# Patient Record
Sex: FEMALE | Race: ASIAN | Employment: FULL TIME | URBAN - NONMETROPOLITAN AREA
[De-identification: names, ages, dates, MRNs, and addresses within clinical notes are randomized per-mention and may not be internally consistent; named-entity substitution may affect disease eponyms.]

---

## 2022-03-02 ENCOUNTER — OFFICE VISIT (OUTPATIENT)
Dept: FAMILY MEDICINE CLINIC | Age: 32
End: 2022-03-02
Payer: COMMERCIAL

## 2022-03-02 VITALS
TEMPERATURE: 97.9 F | DIASTOLIC BLOOD PRESSURE: 78 MMHG | SYSTOLIC BLOOD PRESSURE: 104 MMHG | OXYGEN SATURATION: 98 % | WEIGHT: 184.6 LBS | BODY MASS INDEX: 32.71 KG/M2 | HEART RATE: 79 BPM | HEIGHT: 63 IN

## 2022-03-02 DIAGNOSIS — R40.0 DAYTIME SOMNOLENCE: ICD-10-CM

## 2022-03-02 DIAGNOSIS — R06.83 SNORING: Primary | ICD-10-CM

## 2022-03-02 PROBLEM — B97.7 HUMAN PAPILLOMA VIRUS (HPV) INFECTION: Status: ACTIVE | Noted: 2022-03-02

## 2022-03-02 PROBLEM — R87.810 CERVICAL HIGH RISK HUMAN PAPILLOMAVIRUS (HPV) DNA TEST POSITIVE: Status: ACTIVE | Noted: 2022-03-02

## 2022-03-02 PROCEDURE — 99203 OFFICE O/P NEW LOW 30 MIN: CPT | Performed by: FAMILY MEDICINE

## 2022-03-02 RX ORDER — LEVONORGESTREL AND ETHINYL ESTRADIOL 0.1-0.02MG
KIT ORAL
COMMUNITY

## 2022-03-02 SDOH — ECONOMIC STABILITY: FOOD INSECURITY: WITHIN THE PAST 12 MONTHS, THE FOOD YOU BOUGHT JUST DIDN'T LAST AND YOU DIDN'T HAVE MONEY TO GET MORE.: NEVER TRUE

## 2022-03-02 SDOH — ECONOMIC STABILITY: FOOD INSECURITY: WITHIN THE PAST 12 MONTHS, YOU WORRIED THAT YOUR FOOD WOULD RUN OUT BEFORE YOU GOT MONEY TO BUY MORE.: NEVER TRUE

## 2022-03-02 ASSESSMENT — PATIENT HEALTH QUESTIONNAIRE - PHQ9
SUM OF ALL RESPONSES TO PHQ QUESTIONS 1-9: 0
SUM OF ALL RESPONSES TO PHQ9 QUESTIONS 1 & 2: 0
SUM OF ALL RESPONSES TO PHQ QUESTIONS 1-9: 0
2. FEELING DOWN, DEPRESSED OR HOPELESS: 0
SUM OF ALL RESPONSES TO PHQ QUESTIONS 1-9: 0
SUM OF ALL RESPONSES TO PHQ QUESTIONS 1-9: 0
1. LITTLE INTEREST OR PLEASURE IN DOING THINGS: 0

## 2022-03-02 ASSESSMENT — SOCIAL DETERMINANTS OF HEALTH (SDOH): HOW HARD IS IT FOR YOU TO PAY FOR THE VERY BASICS LIKE FOOD, HOUSING, MEDICAL CARE, AND HEATING?: NOT HARD AT ALL

## 2022-03-02 NOTE — PROGRESS NOTES
Diagnosis Orders   1. Snoring  Sleep Study with PAP Titration   2. Daytime somnolence  Sleep Study with PAP Titration     Return for Based on test results. Patient Instructions       Patient Education        Snoring: Care Instructions  Overview  Snoring is a noise that you may make while breathing during sleep. You snore when the flow of air from your mouth or nose to your lungs makes the tissues of your throat vibrate while you sleep. This usually is caused by a blockage or narrowing in your nose, mouth, or throat (airway). Snoring can be soft, loud, raspy, harsh, hoarse, or fluttering. If you have a bed partner, they may notice that you sleep with your mouth open or that you're restless while sleeping. If snoring interferes with your or your bed partner's sleep, either or both of you may feel tired during the day. You may be able to help reduce your snoring by making changes in your activities and in the way you sleep. Follow-up care is a key part of your treatment and safety. Be sure to make and go to all appointments, and call your doctor if you are having problems. It's also a good idea to know your test results and keep a list of the medicines you take. How can you care for yourself at home? · Lose weight, if needed. Many people who snore are overweight. Weight loss can help reduce the narrowing of the airway and might reduce or stop snoring. · Limit the use of alcohol and medicines. Drinking a lot of alcohol or taking certain medicines, especially sleeping pills or tranquilizers, before sleep may make snoring worse. · Go to bed at the same time each night, and get plenty of sleep. You may snore more when you have not had enough sleep. · Sleep on your side. Sleeping on your side may stop snoring. Try sewing a pocket in the middle of the back of your paLitRes top, putting a tennis ball into the pocket, and stitching it closed. This will help keep you from sleeping on your back.   · Treat breathing problems. For example, a blocked or stuffy nose caused by colds or allergies can disturb airflow. This can lead to snoring. · Use a device that helps keep your airway open during sleep. This could be a device that you put in your mouth. Other examples include strips or disks that you use on your nose. · Do not smoke. Smoking can make snoring worse. If you need help quitting, talk to your doctor about stop-smoking programs and medicines. These can increase your chances of quitting for good. · Raise the head of your bed 4 to 6 inches by putting bricks under the legs of the bed. This may prevent your tongue from falling toward the back of the throat, which can make a blocked or narrow airway worse. Putting pillows under your head will not help. When should you call for help? Watch closely for changes in your health, and be sure to contact your doctor if:    · You snore, and you feel sleepy during the day.     · Your sleeping partner or you notice that you gasp, choke, or stop breathing during sleep.     · You do not get better as expected. Where can you learn more? Go to https://Bioformix.INFIMET. org and sign in to your Playmysong account. Enter Q378 in the LogLogic box to learn more about \"Snoring: Care Instructions. \"     If you do not have an account, please click on the \"Sign Up Now\" link. Current as of: July 6, 2021               Content Version: 13.1  © 2006-2021 [employer]. Care instructions adapted under license by [facility] ([facility]). If you have questions about a medical condition or this instruction, always ask your healthcare professional. [place] [address] any warranty or liability for your use of this information.              Subjective:      Patient ID: Jigna Van is a 27 y.o. female who presents for:  Chief Complaint   Patient presents with    Establish Care     pcp last vist in many years only been seeing OB regularly     Sleep Apnea snoring getting worse x 6 months        Patient states she has been seeing her OB/GYN routinely. She would like to have a PCP and make sure she is up-to-date on all her health maintenance. She will have her records transferred and will double check this. She has been getting her ThinPrep is done yearly with a GYN. Patient also notes her boyfriend states she is snoring more often. It is gotten worse in the last couple years. She has been gaining weight. She notes that when she wakes she does not feel refreshed. She feels like she could always take a nap. Current Outpatient Medications on File Prior to Visit   Medication Sig Dispense Refill    levonorgestrel-ethinyl estradiol (LUTERA) 0.1-20 MG-MCG per tablet Take by mouth       No current facility-administered medications on file prior to visit. Past Medical History:   Diagnosis Date    Skin ulcer of abdominal wall ([facility] [address]     in high school x 20 years ago      History reviewed. No pertinent surgical history. Social History     Socioeconomic History    Marital status: Single     Spouse name: Not on file    Number of children: Not on file    Years of education: Not on file    Highest education level: Not on file   Occupational History    Not on file   Tobacco Use    Smoking status: Never Smoker    Smokeless tobacco: Never Used   Substance and Sexual Activity    Alcohol use: Yes     Comment: social    Drug use: Never    Sexual activity: Not on file   Other Topics Concern    Not on file   Social History Narrative    Not on file     Social Determinants of Health     Financial Resource Strain: Low Risk     Difficulty of Paying Living Expenses: Not hard at all   Food Insecurity: No Food Insecurity    Worried About [address] in the Last Year: Never true    [address] in the Last Year: Never true   Transportation Needs:     Lack of Transportation (Medical): Not on file    Lack of Transportation (Non-Medical):  Not on file Physical Activity:     Days of Exercise per Week: Not on file    Minutes of Exercise per Session: Not on file   Stress:     Feeling of Stress : Not on file   Social Connections:     Frequency of Communication with Friends and Family: Not on file    Frequency of Social Gatherings with Friends and Family: Not on file    Attends Christianity Services: Not on file    Active Member of [address] [place] [address] [employer] or Organizations: Not on file    Attends Club or Organization Meetings: Not on file    Marital Status: Not on file   Intimate Partner Violence:     Fear of Current or Ex-Partner: Not on file    Emotionally Abused: Not on file    Physically Abused: Not on file    Sexually Abused: Not on file   Housing Stability:     Unable to Pay for Housing in the Last Year: Not on file    Number of [place] in the Last Year: Not on file    Unstable Housing in the Last Year: Not on file     Family History   Problem Relation Age of Onset    Stroke Father     Diabetes Sister     Diabetes Maternal Grandmother     Asthma Neg Hx     Cancer Neg Hx      Allergies:  Patient has no known allergies. Review of Systems   Constitutional: Positive for fatigue. Negative for chills, diaphoresis and fever. Psychiatric/Behavioral: Positive for sleep disturbance. Negative for agitation, behavioral problems and confusion. The patient is not nervous/anxious. Objective:   /78   Pulse 79   Temp 97.9 °F (36.6 °C)   Ht 5' 3\" (1.6 m)   Wt 184 lb 9.6 oz (83.7 kg)   LMP 02/16/2022   SpO2 98%   BMI 32.70 kg/m²     Physical Exam  Constitutional:       General: She is not in acute distress. Appearance: She is well-developed. She is not diaphoretic. HENT:      Head: Normocephalic and atraumatic. Right Ear: External ear normal.      Left Ear: External ear normal.      Nose: Nose normal.   Eyes:      General:         Right eye: No discharge. Left eye: No discharge.       Conjunctiva/sclera: Conjunctivae normal. Pupils: Pupils are equal, round, and reactive to light. Neck:      Thyroid: No thyromegaly. Trachea: No tracheal deviation. Cardiovascular:      Rate and Rhythm: Normal rate and regular rhythm. Pulmonary:      Effort: Pulmonary effort is normal. No respiratory distress. Abdominal:      General: There is no distension. Musculoskeletal:      Cervical back: Neck supple. Skin:     General: Skin is warm and dry. Findings: No bruising or rash. Neurological:      Mental Status: She is alert. Coordination: Coordination normal.   Psychiatric:         Thought Content: Thought content normal.         Judgment: Judgment normal.         No results found for this visit on 03/02/22. No results found for this or any previous visit (from the past 2016 hour(s)). [] Pt was seen by provider for      Minutes  Counseling and coordination of care was done for all assessment diagnosis listed for today with patient and any family/friend present. More than 50% of this visit was spent coordinating cuurent care, obtaining information for prior records, and counseling for current plan of action. Assessment:       Diagnosis Orders   1. Snoring  Sleep Study with PAP Titration   2. Daytime somnolence  Sleep Study with PAP Titration         Orders Placed This Encounter   Procedures    Sleep Study with PAP Titration     Standing Status:   Future     Standing Expiration Date:   6/2/2022     Order Specific Question:   Sleep Study Titration Type     Answer:   Split Night Study (Baseline followed by PAP Titration)     Order Specific Question:   Location For Sleep Study     Answer:   [facility]     Order Specific Question:   Select Sleep Lab Location     Answer:   [facility]       No orders of the defined types were placed in this encounter. Medication List          Accurate as of March 2, 2022  4:35 PM. If you have any questions, ask your nurse or doctor.             CONTINUE taking these medications    Lutera 0.1-20 MG-MCG per tablet  Generic drug: levonorgestrel-ethinyl estradiol              Plan:   Return for Based on test results. Patient Instructions       Patient Education        Snoring: Care Instructions  Overview  Snoring is a noise that you may make while breathing during sleep. You snore when the flow of air from your mouth or nose to your lungs makes the tissues of your throat vibrate while you sleep. This usually is caused by a blockage or narrowing in your nose, mouth, or throat (airway). Snoring can be soft, loud, raspy, harsh, hoarse, or fluttering. If you have a bed partner, they may notice that you sleep with your mouth open or that you're restless while sleeping. If snoring interferes with your or your bed partner's sleep, either or both of you may feel tired during the day. You may be able to help reduce your snoring by making changes in your activities and in the way you sleep. Follow-up care is a key part of your treatment and safety. Be sure to make and go to all appointments, and call your doctor if you are having problems. It's also a good idea to know your test results and keep a list of the medicines you take. How can you care for yourself at home? · Lose weight, if needed. Many people who snore are overweight. Weight loss can help reduce the narrowing of the airway and might reduce or stop snoring. · Limit the use of alcohol and medicines. Drinking a lot of alcohol or taking certain medicines, especially sleeping pills or tranquilizers, before sleep may make snoring worse. · Go to bed at the same time each night, and get plenty of sleep. You may snore more when you have not had enough sleep. · Sleep on your side. Sleeping on your side may stop snoring. Try sewing a pocket in the middle of the back of your pashopp top, putting a tennis ball into the pocket, and stitching it closed. This will help keep you from sleeping on your back.   · Treat breathing problems. For example, a blocked or stuffy nose caused by colds or allergies can disturb airflow. This can lead to snoring. · Use a device that helps keep your airway open during sleep. This could be a device that you put in your mouth. Other examples include strips or disks that you use on your nose. · Do not smoke. Smoking can make snoring worse. If you need help quitting, talk to your doctor about stop-smoking programs and medicines. These can increase your chances of quitting for good. · Raise the head of your bed 4 to 6 inches by putting bricks under the legs of the bed. This may prevent your tongue from falling toward the back of the throat, which can make a blocked or narrow airway worse. Putting pillows under your head will not help. When should you call for help? Watch closely for changes in your health, and be sure to contact your doctor if:    · You snore, and you feel sleepy during the day.     · Your sleeping partner or you notice that you gasp, choke, or stop breathing during sleep.     · You do not get better as expected. Where can you learn more? Go to https://Thrillist.com.MOD Systems. org and sign in to your ShareThe account. Enter V788 in the Shocking Technologies box to learn more about \"Snoring: Care Instructions. \"     If you do not have an account, please click on the \"Sign Up Now\" link. Current as of: July 6, 2021               Content Version: 13.1  © 2006-2021 [employer]. Care instructions adapted under license by [facility] ([facility]). If you have questions about a medical condition or this instruction, always ask your healthcare professional. [address] any warranty or liability for your use of this information. This note was partially created with the assistance of dictation. This may lead to grammatical or spelling errors. Parag Nowak M.D.

## 2022-03-02 NOTE — PATIENT INSTRUCTIONS
Patient Education        Snoring: Care Instructions  Overview  Snoring is a noise that you may make while breathing during sleep. You snore when the flow of air from your mouth or nose to your lungs makes the tissues of your throat vibrate while you sleep. This usually is caused by a blockage or narrowing in your nose, mouth, or throat (airway). Snoring can be soft, loud, raspy, harsh, hoarse, or fluttering. If you have a bed partner, they may notice that you sleep with your mouth open or that you're restless while sleeping. If snoring interferes with your or your bed partner's sleep, either or both of you may feel tired during the day. You may be able to help reduce your snoring by making changes in your activities and in the way you sleep. Follow-up care is a key part of your treatment and safety. Be sure to make and go to all appointments, and call your doctor if you are having problems. It's also a good idea to know your test results and keep a list of the medicines you take. How can you care for yourself at home? · Lose weight, if needed. Many people who snore are overweight. Weight loss can help reduce the narrowing of the airway and might reduce or stop snoring. · Limit the use of alcohol and medicines. Drinking a lot of alcohol or taking certain medicines, especially sleeping pills or tranquilizers, before sleep may make snoring worse. · Go to bed at the same time each night, and get plenty of sleep. You may snore more when you have not had enough sleep. · Sleep on your side. Sleeping on your side may stop snoring. Try sewing a pocket in the middle of the back of your paOz Sonotek top, putting a tennis ball into the pocket, and stitching it closed. This will help keep you from sleeping on your back. · Treat breathing problems. For example, a blocked or stuffy nose caused by colds or allergies can disturb airflow. This can lead to snoring. · Use a device that helps keep your airway open during sleep.

## 2022-04-21 ENCOUNTER — HOSPITAL ENCOUNTER (OUTPATIENT)
Dept: SLEEP CENTER | Age: 32
Discharge: HOME | End: 2022-04-23
Payer: COMMERCIAL

## 2022-04-21 PROCEDURE — 95810 POLYSOM 6/> YRS 4/> PARAM: CPT

## 2022-04-25 PROCEDURE — 95810 POLYSOM 6/> YRS 4/> PARAM: CPT | Performed by: INTERNAL MEDICINE

## 2022-06-07 ENCOUNTER — TELEMEDICINE (OUTPATIENT)
Dept: FAMILY MEDICINE CLINIC | Age: 32
End: 2022-06-07
Payer: COMMERCIAL

## 2022-06-07 DIAGNOSIS — J34.2 DEVIATED SEPTUM: ICD-10-CM

## 2022-06-07 DIAGNOSIS — R06.83 SNORING: Primary | ICD-10-CM

## 2022-06-07 PROCEDURE — 99213 OFFICE O/P EST LOW 20 MIN: CPT | Performed by: STUDENT IN AN ORGANIZED HEALTH CARE EDUCATION/TRAINING PROGRAM

## 2022-06-07 ASSESSMENT — ENCOUNTER SYMPTOMS
COUGH: 0
SINUS PRESSURE: 0
SORE THROAT: 0
ABDOMINAL PAIN: 0
SHORTNESS OF BREATH: 0
VOMITING: 0

## 2022-06-07 NOTE — PROGRESS NOTES
2022    TELEHEALTH EVALUATION -- Audio/Visual (During [place] [address] public health emergency)    Due to [place] 19 outbreak, patient's office visit was converted to a virtual visit. Patient was contacted and agreed to proceed with a virtual visit via [employer]. me  The risks and benefits of converting to a virtual visit were discussed in light of the current infectious disease epidemic. Patient also understood that insurance coverage and co-pays are up to their individual insurance plans. HPI:  Sandy Miramontes (:  1990) has requested an audio/video evaluation for the following concern(s):  Chief Complaint   Patient presents with    Results     Sleep Study. Review sleep study  No significant sleep apnea, no significant oxygen desaturation, mild snoring, normal sleep efficiency, normal sleep onset, no periodic limb movement  Prolonged REM onset 130 min    Patient has noticed weight gain over the last several years which seems to worsen her snoring  Typically she only sleeps 5 to 6 hours at nighttime due to work schedule  On the weekends she does try to \" catch up on sleep\"  She also does have a history of right-sided deviated septum  She was told by ENT in the past that she should have surgical correction    Review of Systems   Constitutional: Negative for chills and fever. HENT: Negative for congestion, sinus pressure and sore throat. Respiratory: Negative for cough and shortness of breath. Cardiovascular: Negative for chest pain and palpitations. Gastrointestinal: Negative for abdominal pain and vomiting. Musculoskeletal: Negative for arthralgias and myalgias. Skin: Negative for rash and wound. Neurological: Negative for speech difficulty and light-headedness. Psychiatric/Behavioral: Negative for suicidal ideas. The patient is not nervous/anxious. Prior to Visit Medications    Medication Sig Taking?  Authorizing Provider   levonorgestrel-ethinyl estradiol (LUTERA) 0.1-20 MG-MCG per tablet Take by mouth Yes Historical Provider, MD       Social History     Tobacco Use    Smoking status: Never Smoker    Smokeless tobacco: Never Used   Substance Use Topics    Alcohol use: Yes     Comment: social    Drug use: Never        No Known Allergies,   Past Medical History:   Diagnosis Date    Skin ulcer of abdominal wall ([address]     in high school x 20 years ago    , History reviewed. No pertinent surgical history. ,   Social History     Tobacco Use    Smoking status: Never Smoker    Smokeless tobacco: Never Used   Substance Use Topics    Alcohol use: Yes     Comment: social    Drug use: Never   ,   Family History   Problem Relation Age of Onset    Stroke Father     Diabetes Sister     Diabetes Maternal Grandmother     Asthma Neg Hx     Cancer Neg Hx    ,   Immunization History   Administered Date(s) Administered    COVID-19, Pfizer Purple top, DILUTE for use, 12+ yrs, 30mcg/0.3mL dose 07/31/2021, 08/21/2021   ,   Health Maintenance   Topic Date Due    Varicella vaccine (1 of 2 - 2-dose childhood series) Never done    HIV screen  Never done    Hepatitis C screen  Never done    DTaP/Tdap/Td vaccine (1 - Tdap) Never done    Cervical cancer screen  Never done    COVID-19 Vaccine (3 - Booster for [email or web address] series) 01/21/2022    Flu vaccine (Season Ended) 09/01/2022    Depression Screen  03/02/2023    Hepatitis A vaccine  Aged Out    Hepatitis B vaccine  Aged Out    Hib vaccine  Aged Out    Meningococcal (ACWY) vaccine  Aged Out    Pneumococcal 0-64 years Vaccine  Aged Out       PHYSICAL EXAMINATION:  [ INSTRUCTIONS:  \"[x]\" Indicates a positive item  \"[]\" Indicates a negative item  -- DELETE ALL ITEMS NOT EXAMINED]  [x] Alert  [] Oriented to person/place/time    [x] No apparent distress  [] Toxic appearing    [] Face flushed appearing [x] Sclera clear  [] Lips are cyanotic      [x] Breathing appears normal  [] Appears tachypneic      [] Rash on visible skin    [x] Cranial Nerves II-XII grossly intact    [x] Motor grossly intact in visible upper extremities    [] Motor grossly intact in visible lower extremities    [x] Normal Mood  [] Anxious appearing    [] Depressed appearing  [] Confused appearing      [] Poor short term memory  [] Poor long term memory    [] OTHER:      Due to this being a TeleHealth encounter, evaluation of the following organ systems is limited: Vitals/Constitutional/EENT/Resp/CV/GI//MS/Neuro/Skin/Heme-Lymph-Imm. ASSESSMENT/PLAN:  1. Snoring  No significant sleep apnea, no significant oxygen desaturation, mild snoring, normal sleep efficiency, normal sleep onset, no periodic limb movement  Prolonged REM onset 130 min  Patient does admit to sleeping only 5 to 6 hours on the weekdays and catches up on sleep on the weekends  Discussed that lack of sleep can cause weight gain and recommended 7 to 8 hours nightly  Poor sleep prior to sleep study may be cause of prolonged REM onset  Discussed that weight loss may help with snoring as increased weight can cause compression of the neck  Patient can schedule with me or Josie Urbina if she would like to discuss weight management further    2. Deviated septum  Patient states she has history of deviated septum on the right side  Was told by her last ENT that she should have surgical correction  She would like referral at this time for second opinion    - Sosa Hay MD, Otolaryngology, [place]    3. Body mass index (BMI) of 32.0-32.9 in adult        Return in about 6 months (around 12/7/2022). An  electronic signature was used to authenticate this note.     --Irene Starkey DO on 6/7/2022 at 9:34 AM        Pursuant to the emergency declaration under the [facility] waiver authority and the [employer] and [employer] Act, this Virtual  Visit was conducted, with patient's consent, to reduce the patient's risk of exposure to COVID-19 and provide continuity of care for an established patient. Services were provided through a video synchronous discussion virtually to substitute for in-person clinic visit.

## 2023-03-30 ENCOUNTER — HOSPITAL ENCOUNTER (OUTPATIENT)
Dept: DATA CONVERSION | Facility: HOSPITAL | Age: 33
End: 2023-03-30
Attending: OTOLARYNGOLOGY | Admitting: OTOLARYNGOLOGY

## 2023-03-30 DIAGNOSIS — J34.2 DEVIATED NASAL SEPTUM: ICD-10-CM

## 2023-03-30 DIAGNOSIS — J34.3 HYPERTROPHY OF NASAL TURBINATES: ICD-10-CM

## 2023-09-07 VITALS
SYSTOLIC BLOOD PRESSURE: 126 MMHG | TEMPERATURE: 97.5 F | HEART RATE: 81 BPM | RESPIRATION RATE: 16 BRPM | DIASTOLIC BLOOD PRESSURE: 80 MMHG

## 2023-09-14 NOTE — H&P
History of Present Illness:   Pregnant/Lactating:  ·  Are You Pregnant no   ·  Are You Currently Breastfeeding no     History Present Illness:  Reason for surgery: Deviated septum and turbinate  hypertrophy   HPI:    Deviated septum with turbinate hypertrophy for definitive septoplasty and turbinate surgery to optimize the airway.    Allergies:        Allergies:  ·  No Known Allergies :     Home Medication Review:   Home Medications Reviewed: yes     Impression/Procedure:   ·  Impression and Planned Procedure: Deviated septum with turbinate hypertrophy for definitive septoplasty and turbinate surgery to optimize the airway.       Vital Signs:  Temperature C: 36.4 degrees C   Temperature F: 97.5 degrees F   Heart Rate: 81 beats per minute   Respiratory Rate: 16 breath per minute   Blood Pressure Systolic: 126 mm/Hg   Blood Pressure Diastolic: 80 mm/Hg     Physical Exam by System:    Constitutional: Well developed, awake/alert/oriented  x3, no distress, alert and cooperative   ENMT: mucous membranes moist, no apparent injury,  no lesions seen  Deviated septum with turbinate hypertrophy noted   Head/Neck: Neck supple, no apparent injury, thyroid  without mass or tenderness, No JVD, trachea midline, no bruits   Respiratory/Thorax: Patent airways, CTAB, normal  breath sounds with good chest expansion, thorax symmetric   Cardiovascular: Regular, rate and rhythm, no murmurs,  2+ equal pulses of the extremities, normal S 1and S 2   Gastrointestinal: Nondistended, soft, non-tender,  no rebound tenderness or guarding, no masses palpable, no organomegaly, +BS, no bruits     Consent:   COVID-19 Consent:  ·  COVID-19 Risk Consent Surgeon has reviewed key risks related to the risk of duncan COVID-19 and if they contract COVID-19 what the risks are.       Electronic Signatures:  Grabiel Ludwig)  (Signed 30-Mar-2023 11:52)   Authored: History of Present Illness, Allergies, Home  Medication Review, Impression/Procedure,  Physical Exam, Consent, Note Completion      Last Updated: 30-Mar-2023 11:52 by Grabiel Ludwig)

## 2023-10-02 NOTE — OP NOTE
PROCEDURE DETAILS    Preoperative Diagnosis:  Deviated septum with inferior turbinate hypertrophy  Postoperative Diagnosis:  Deviated septum with inferior turbinate hypertrophy  Surgeon: Grabiel Ludwig  Resident/Fellow/Other Assistant: None of these were associated with this case    Procedure:  1. SEPTOPLASTY,   2. OUTFRACTURE INFERIOR TURBINATES  3. COBLATION TURBINATES     Anesthesia: Rohan Sellers  Estimated Blood Loss: Minimal  Findings: Severe deviation right  Specimens(s) Collected: no,     Complications: None        Operative Report:   The patient was taken to the operating room and administered general anesthesia.  Appropriate prep and drape and timeout were performed in usual manner.  The patient had each of the  inferior turbinates identified.  Each was infiltrated with lidocaine 1%-epinephrine 1/100,000.  Each inferior turbinate was outfractured.  Each inferior turbinate was subjected to Coblation radiofrequency reduction utilizing the Coblation wand at a setting  of 6 for roughly 8 to 10 seconds per pass.  Upon completion of same the patient had standard hemitransfixion incision carried out on the right-hand side with elevation of the right sided mucoperichondrial mucoperiosteal flap.  The region of the bony cartilaginous  junction was transgressed with elevation of the left-sided mucoperiosteal flap.  Superior and inferior cuts were made to remove the deflected portion of septum including a prominent spur on the right-hand side.  The residual cartilaginous septum was mobilized  or centrally over the maxillary crest with significant improvement in particular in the right nasal airway.  All free fragments of cartilage and bone removed.  Hemitransfixion incision closed with interrupted chromic.  Septal splints placed and secured  with a through and through Prolene suture.  Telfa packing placed and secured with Prolene.  Patient released and taken recovery stable  condition.                        Attestation:   Note Completion:  Attending Attestation I performed the procedure without a resident         Electronic Signatures:  Grabiel Ludwig)  (Signed 30-Mar-2023 15:01)   Authored: Post-Operative Note, Chart Review, Note Completion      Last Updated: 30-Mar-2023 15:01 by Grabiel Ludwig)

## 2024-05-08 PROBLEM — G47.33 OSA (OBSTRUCTIVE SLEEP APNEA): Status: ACTIVE | Noted: 2024-05-08

## 2024-05-08 PROBLEM — R87.810 HIGH-RISK HUMAN PAPILLOMAVIRUS (HPV) DNA DETECTED IN CERVICAL SPECIMEN: Status: ACTIVE | Noted: 2022-03-02

## 2024-05-09 PROBLEM — Z00.00 ANNUAL PHYSICAL EXAM: Status: ACTIVE | Noted: 2024-05-09

## 2024-05-10 ENCOUNTER — OFFICE VISIT (OUTPATIENT)
Dept: PRIMARY CARE | Facility: CLINIC | Age: 34
End: 2024-05-10
Payer: COMMERCIAL

## 2024-05-10 VITALS
DIASTOLIC BLOOD PRESSURE: 82 MMHG | OXYGEN SATURATION: 97 % | BODY MASS INDEX: 33.13 KG/M2 | HEART RATE: 83 BPM | TEMPERATURE: 97.5 F | SYSTOLIC BLOOD PRESSURE: 112 MMHG | HEIGHT: 63 IN | RESPIRATION RATE: 18 BRPM | WEIGHT: 187 LBS

## 2024-05-10 DIAGNOSIS — R87.810 HIGH-RISK HUMAN PAPILLOMAVIRUS (HPV) DNA DETECTED IN CERVICAL SPECIMEN: ICD-10-CM

## 2024-05-10 DIAGNOSIS — Z00.00 ANNUAL PHYSICAL EXAM: Primary | ICD-10-CM

## 2024-05-10 DIAGNOSIS — S93.401A SPRAIN OF RIGHT ANKLE, UNSPECIFIED LIGAMENT, INITIAL ENCOUNTER: ICD-10-CM

## 2024-05-10 DIAGNOSIS — Z30.9 ENCOUNTER FOR CONTRACEPTIVE MANAGEMENT, UNSPECIFIED TYPE: ICD-10-CM

## 2024-05-10 PROCEDURE — 99385 PREV VISIT NEW AGE 18-39: CPT | Performed by: PHYSICIAN ASSISTANT

## 2024-05-10 PROCEDURE — 1036F TOBACCO NON-USER: CPT | Performed by: PHYSICIAN ASSISTANT

## 2024-05-10 RX ORDER — LEVONORGESTREL/ETHIN.ESTRADIOL 0.1-0.02MG
1 TABLET ORAL DAILY
COMMUNITY
End: 2024-05-10 | Stop reason: SDUPTHER

## 2024-05-10 RX ORDER — LEVONORGESTREL/ETHIN.ESTRADIOL 0.1-0.02MG
1 TABLET ORAL DAILY
Qty: 28 TABLET | Refills: 12 | Status: SHIPPED | OUTPATIENT
Start: 2024-05-10

## 2024-05-10 RX ORDER — ACETAMINOPHEN 325 MG/1
TABLET ORAL EVERY 6 HOURS PRN
COMMUNITY

## 2024-05-10 ASSESSMENT — ENCOUNTER SYMPTOMS: ARTHRALGIAS: 1

## 2024-05-10 NOTE — ASSESSMENT & PLAN NOTE
- Labs/ Lipid screen: DUE, ordered today   - PAP: DUE, advised she schedule   - Tdap: DUE   - Discussed DIET - suboptimal - reduce snacks, processed foods, sugary and greasy foods, fast foods. Increase healthy alternatives, whole grains, fruits vegetables.  - Discussed EXERCISE - Doing great with this, congratulated the pt and encourage she ocntinue!   - Encouraged pt to get yearly eye and dental exams  - Limit alcohol consumption.

## 2024-05-10 NOTE — PROGRESS NOTES
"Subjective   Patient ID: Rhoda Mackey is a 34 y.o. female who presents for Establish Care (New pt here today to Est Care; hasn't seen a PCP in about 2 years. Pt would like a ref to a Sports Orthopedic because she twisted right ankle back in end of Dec and going into her right heel. ).    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 Colposcopy 10/14/2020 - \"glandular tissue with chronic inflammation, negative for dysplasia). Advised PAP in 1 year   - Td: DUE   - Diet: needs improvement, trying to integrate more fruits and veggies, still a lot of fried fruit, carbs. Drinks all water some protein shakes.   - Exercise: when in [facility] works out 4 days a week -  for an hour, Pilates class in the [facility]. When home on weekends will do a long walk   - Sexual hx:   - Tobacco: never   - Illicit drugs: none   - Alcohol: 2-3 cocktails on the weekends   - Mood: normal   - Dentist visits: due   - Eye exams: due      Right heel pain  - Requesting ortho referral   - End of December, stepped in a pothole and eversion injury. Was able to walk fine after. Iced it, rested it.   - It was swollen for a while   - Still feels pain when walking, bending  - Was trying to start running but then felt pain at bottom of her heel   - Started feeling pin in left knee so worries about compensating     Review of Systems   Musculoskeletal:  Positive for arthralgias (right ankle).       Objective   /82   Pulse 83   Temp 36.4 °C (97.5 °F)   Resp 18   Ht 1.607 m (5' 3.25\")   Wt 84.8 kg (187 lb)   SpO2 97%   BMI 32.86 kg/m²     Physical Exam  Constitutional:       General: She is not in acute distress.     Appearance: Normal appearance. She is normal weight.   HENT:      Head: " "Normocephalic.      Right Ear: Tympanic membrane and ear canal normal.      Left Ear: Tympanic membrane and ear canal normal.      Nose: Nose normal.      Mouth/Throat:      Mouth: Mucous membranes are moist.      Pharynx: Oropharynx is clear.   Eyes:      Extraocular Movements: Extraocular movements intact.      Conjunctiva/sclera: Conjunctivae normal.      Pupils: Pupils are equal, round, and reactive to light.   Cardiovascular:      Rate and Rhythm: Normal rate and regular rhythm.      Pulses: Normal pulses.      Heart sounds: No murmur heard.  Pulmonary:      Effort: Pulmonary effort is normal.      Breath sounds: Normal breath sounds. No wheezing, rhonchi or rales.   Abdominal:      General: Bowel sounds are normal. There is no distension.      Palpations: Abdomen is soft. There is no mass.      Tenderness: There is no abdominal tenderness. There is no guarding.   Musculoskeletal:         General: Normal range of motion.      Cervical back: Neck supple.   Lymphadenopathy:      Cervical: No cervical adenopathy.   Skin:     General: Skin is warm and dry.      Findings: No lesion or rash.   Neurological:      General: No focal deficit present.      Mental Status: She is alert.      Gait: Gait normal.   Psychiatric:         Mood and Affect: Mood normal.         Assessment/Plan     Problem List Items Addressed This Visit       High-risk human papillomavirus (HPV) DNA detected in cervical specimen    Overview     - Saw GYN on 9/2/2020 - neg PAP, positive HPV 18.  - Had Colposcopy 10/14/2020 - \"glandular tissue with chronic inflammation, negative for dysplasia.\" Advised PAP in 1 year - had not done         Current Assessment & Plan     - Educated the pt about these results and the need for follow up PAP smear and advised she schedule          Annual physical exam - Primary    Overview     - Lives in [place] with boyfriendeneen Leija.   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 Increase healthy alternatives, whole grains, fruits vegetables.  - Discussed EXERCISE - Doing great with this, congratulated the pt and encourage she ocntinue!   - Encouraged pt to get yearly eye and dental exams  - Limit alcohol consumption.          Relevant Orders    CBC    Comprehensive Metabolic Panel    Hemoglobin A1C    Lipid Panel    Contraception management    Relevant Medications    levonorgestreL-ethinyl estrad (Aviane, Alesse, Lessina) 0.1-20 mg-mcg tablet     Other Visit Diagnoses       Sprain of right ankle, unspecified ligament, initial encounter        Relevant Orders    Referral to Orthopaedic Surgery        - Recommended physical therapy for the right ankle sprain - pt declined for now, would prefer to see ortho. I did educate her about some balanced/proprioception exercises to begin  - Recommend brace for support   - Referral placed

## 2024-05-10 NOTE — ASSESSMENT & PLAN NOTE
- Educated the pt about these results and the need for follow up PAP smear and advised she schedule

## 2024-05-17 ENCOUNTER — HOSPITAL ENCOUNTER (OUTPATIENT)
Dept: RADIOLOGY | Facility: HOSPITAL | Age: 34
Discharge: HOME | End: 2024-05-17
Payer: COMMERCIAL

## 2024-05-17 ENCOUNTER — OFFICE VISIT (OUTPATIENT)
Dept: ORTHOPEDIC SURGERY | Facility: CLINIC | Age: 34
End: 2024-05-17
Payer: COMMERCIAL

## 2024-05-17 DIAGNOSIS — S93.401A SPRAIN OF RIGHT ANKLE, UNSPECIFIED LIGAMENT, INITIAL ENCOUNTER: ICD-10-CM

## 2024-05-17 DIAGNOSIS — M76.71 PERONEAL TENDINITIS, RIGHT: ICD-10-CM

## 2024-05-17 DIAGNOSIS — M72.2 PLANTAR FASCIITIS, RIGHT: ICD-10-CM

## 2024-05-17 PROCEDURE — 99204 OFFICE O/P NEW MOD 45 MIN: CPT | Performed by: FAMILY MEDICINE

## 2024-05-17 PROCEDURE — 73610 X-RAY EXAM OF ANKLE: CPT | Mod: RT

## 2024-05-17 PROCEDURE — 73610 X-RAY EXAM OF ANKLE: CPT | Mod: RIGHT SIDE | Performed by: STUDENT IN AN ORGANIZED HEALTH CARE EDUCATION/TRAINING PROGRAM

## 2024-05-17 PROCEDURE — 1036F TOBACCO NON-USER: CPT | Performed by: FAMILY MEDICINE

## 2024-05-17 RX ORDER — METHYLPREDNISOLONE 4 MG/1
TABLET ORAL
Qty: 21 TABLET | Refills: 0 | Status: SHIPPED | OUTPATIENT
Start: 2024-05-17

## 2024-05-17 RX ORDER — NAPROXEN 500 MG/1
500 TABLET ORAL 2 TIMES DAILY
Qty: 60 TABLET | Refills: 0 | Status: SHIPPED | OUTPATIENT
Start: 2024-05-17 | End: 2024-06-16

## 2024-05-17 NOTE — PROGRESS NOTES
Acute Injury New Patient Visit    CC:   Chief Complaint   Patient presents with    Right Ankle - Pain     Ankle sprain  DOI- 12/2023  XRAY TODAY       HPI: Rhoda is a 34 y.o.female who presents today with new complaints of continued pain and discomfort to the right ankle and heel status post an injury which took place back in December 2023.  She presents here today to request of her primary care doc for further evaluation management.  Patient points to the bottom of the heel as well as the posterior lateral aspect of the ankle and the soft tissues as area most pain and discomfort.  She states she tripped in a pothole back in December she had iced and elevated it had a little bit of swelling swelling is certainly gone down her biggest complaint actually seems to be more at the heel where she has some discomfort with the first several steps in the morning.  The foot does loosen up most of her discomfort is with weightbearing without any shoes on.  She has not done any over-the-counter remedies medications or exercises to treat this discomfort to date.  She has not had any prior significant injury or problems to the foot or ankle in the past.        Review of Systems   GENERAL: Negative for malaise, significant weight loss, fever  MUSCULOSKELETAL: See HPI  NEURO: Negative for numbness / tingling     Past Medical History  Past Medical History:   Diagnosis Date    Eczema     Other conditions influencing health status     No significant past medical history       Medication review  Medication Documentation Review Audit       Reviewed by Arina Isidro MA (Medical Assistant) on 05/17/24 at 1016      Medication Order Taking? Sig Documenting Provider Last Dose Status   acetaminophen (Tylenol) 325 mg tablet [id number] No Take by mouth every 6 hours if needed for mild pain (1 - 3). Historical Provider, MD Taking Active   levonorgestreL-ethinyl estrad (Aviane, Alesse, Lessina) 0.1-20 mg-mcg tablet [id number]  Take 1 tablet by  mouth once daily. Suzy Xiao PA-C  Active                    Allergies  No Known Allergies    Social History  Social History     Socioeconomic History    Marital status: Single     Spouse name: Not on file    Number of children: Not on file    Years of education: Not on file    Highest education level: Not on file   Occupational History    Not on file   Tobacco Use    Smoking status: Never    Smokeless tobacco: Never   Vaping Use    Vaping status: Never Used   Substance and Sexual Activity    Alcohol use: Yes     Alcohol/week: 3.0 standard drinks of alcohol     Types: 3 Standard drinks or equivalent per week    Drug use: Never    Sexual activity: Yes     Partners: Male     Birth control/protection: Other     Comment: Daily pill   Other Topics Concern    Not on file   Social History Narrative    Not on file     Social Determinants of Health     Financial Resource Strain: Low Risk  (3/2/2022)    Received from [facility]    Overall Financial Resource Strain (CARDIA)     Difficulty of Paying Living Expenses: Not hard at all   Food Insecurity: No Food Insecurity (3/2/2022)    Received from [facility]    Hunger Vital Sign     Worried About Running Out of Food in the Last Year: Never true     Ran Out of Food in the Last Year: Never true   Transportation Needs: Not on file   Physical Activity: Not on file   Stress: Not on file   Social Connections: Not on file   Intimate Partner Violence: Not on file   Housing Stability: Not on file       Surgical History  Past Surgical History:   Procedure Laterality Date    DECUBITUS ULCER EXCISION      Decubitus ulcer excision    NASAL SEPTUM SURGERY         Physical Exam:  GENERAL:  Patient is awake, alert, and oriented to person place and time.  Patient appears well nourished and well kept.  Affect Calm, Not Acutely Distressed.  HEENT:  Normocephalic, Atraumatic, EOMI  CARDIOVASCULAR:  Hemodynamically stable.  RESPIRATORY:  Normal  respirations with unlabored breathing.  NEURO: Gross sensation intact to the lower extremities bilaterally.  Extremity: Right ankle exam: On inspection no redness warmth erythema pulses and sensation are intact no midfoot or distal metatarsal pain no medial or lateral malleoli or pain no pain across the anterior joint line negative ATFL CFL ligament discomfort.  No anterior drawer pain.  Negative heel tap calcaneal squeeze mild discomfort with pressure across the insertion of the plantar fascia.  Mild to minimal soft tissue discomfort over the peroneals laterally.  Negative tib-fib squeeze, calf is soft nontender.      Diagnostics: Right hip films today demonstrate no presence for acute fracture or dislocation small plantar calcaneal enthesophyte seen.        Procedure: None  Procedures    Assessment:   Problem List Items Addressed This Visit    None  Visit Diagnoses       Sprain of right ankle, unspecified ligament, initial encounter        Relevant Orders    XR ankle right 3+ views    Plantar fasciitis, right        Relevant Medications    naproxen (Naprosyn) 500 mg tablet    methylPREDNISolone (Medrol Dospak) 4 mg tablets    Other Relevant Orders    Referral to Physical Therapy    Peroneal tendinitis, right        Relevant Medications    naproxen (Naprosyn) 500 mg tablet    methylPREDNISolone (Medrol Dospak) 4 mg tablets    Other Relevant Orders    Referral to Physical Therapy             Plan: At this time we discussed a conservative approach and management of likely peroneal tendinitis and plantar fasciitis.  Will start off with a short course of an oral steroid followed by an anti-inflammatory.  Will offer her short course of physical therapy in addition to the medications.  We did discuss a night splint she would like to look over-the-counter for over-the-counter orthotics/inserts for plantar fasciitis and she would look at a potential nightguard/night splint for her foot as well.  We will see her back in 6  weeks for repeat evaluation no need for x-rays at that time with any persistent pain or discomfort we will consider ultrasound evaluation and possible soft tissue injection.  No need for further advanced imaging here today.  Orders Placed This Encounter    XR ankle right 3+ views    Referral to Physical Therapy    naproxen (Naprosyn) 500 mg tablet    methylPREDNISolone (Medrol Dospak) 4 mg tablets      At the conclusion of the visit there were no further questions by the patient/family regarding their plan of care.  Patient was instructed to call or return with any issues, questions, or concerns regarding their injury and/or treatment plan described above.     05/17/24 at 10:55 AM - Cole C Budinsky, MD    Office: [phone]    This note was prepared using voice recognition software.  The details of this note are correct and have been reviewed, and corrected to the best of my ability.  Some grammatical errors may persist related to the Dragon software.

## 2024-06-03 ENCOUNTER — LAB (OUTPATIENT)
Dept: LAB | Facility: LAB | Age: 34
End: 2024-06-03
Payer: COMMERCIAL

## 2024-06-03 DIAGNOSIS — Z00.00 ANNUAL PHYSICAL EXAM: ICD-10-CM

## 2024-06-03 LAB
ALBUMIN SERPL BCP-MCNC: 4.1 G/DL (ref 3.4–5)
ALP SERPL-CCNC: 55 U/L (ref 33–110)
ALT SERPL W P-5'-P-CCNC: 21 U/L (ref 7–45)
ANION GAP SERPL CALC-SCNC: 11 MMOL/L (ref 10–20)
AST SERPL W P-5'-P-CCNC: 20 U/L (ref 9–39)
BILIRUB SERPL-MCNC: 0.4 MG/DL (ref 0–1.2)
BUN SERPL-MCNC: 13 MG/DL (ref 6–23)
CALCIUM SERPL-MCNC: 9.3 MG/DL (ref 8.6–10.3)
CHLORIDE SERPL-SCNC: 106 MMOL/L (ref 98–107)
CHOLEST SERPL-MCNC: 185 MG/DL (ref 0–199)
CHOLESTEROL/HDL RATIO: 3.2
CO2 SERPL-SCNC: 24 MMOL/L (ref 21–32)
CREAT SERPL-MCNC: 0.84 MG/DL (ref 0.5–1.05)
EGFRCR SERPLBLD CKD-EPI 2021: >90 ML/MIN/1.73M*2
ERYTHROCYTE [DISTWIDTH] IN BLOOD BY AUTOMATED COUNT: 12.3 % (ref 11.5–14.5)
GLUCOSE SERPL-MCNC: 96 MG/DL (ref 74–99)
HCT VFR BLD AUTO: 40.5 % (ref 36–46)
HDLC SERPL-MCNC: 57.9 MG/DL
HGB BLD-MCNC: 13.7 G/DL (ref 12–16)
LDLC SERPL CALC-MCNC: 75 MG/DL
MCH RBC QN AUTO: 30 PG (ref 26–34)
MCHC RBC AUTO-ENTMCNC: 33.8 G/DL (ref 32–36)
MCV RBC AUTO: 89 FL (ref 80–100)
NON HDL CHOLESTEROL: 127 MG/DL (ref 0–149)
NRBC BLD-RTO: 0 /100 WBCS (ref 0–0)
PLATELET # BLD AUTO: 341 X10*3/UL (ref 150–450)
POTASSIUM SERPL-SCNC: 4.2 MMOL/L (ref 3.5–5.3)
PROT SERPL-MCNC: 7.3 G/DL (ref 6.4–8.2)
RBC # BLD AUTO: 4.57 X10*6/UL (ref 4–5.2)
SODIUM SERPL-SCNC: 137 MMOL/L (ref 136–145)
TRIGL SERPL-MCNC: 261 MG/DL (ref 0–149)
VLDL: 52 MG/DL (ref 0–40)
WBC # BLD AUTO: 8.5 X10*3/UL (ref 4.4–11.3)

## 2024-06-03 PROCEDURE — 80061 LIPID PANEL: CPT

## 2024-06-03 PROCEDURE — 85027 COMPLETE CBC AUTOMATED: CPT

## 2024-06-03 PROCEDURE — 80053 COMPREHEN METABOLIC PANEL: CPT

## 2024-06-03 PROCEDURE — 36415 COLL VENOUS BLD VENIPUNCTURE: CPT

## 2024-06-03 PROCEDURE — 83036 HEMOGLOBIN GLYCOSYLATED A1C: CPT

## 2024-06-04 LAB
EST. AVERAGE GLUCOSE BLD GHB EST-MCNC: 100 MG/DL
HBA1C MFR BLD: 5.1 %

## 2024-06-06 PROBLEM — J34.3 HYPERTROPHY OF NASAL TURBINATES: Status: ACTIVE | Noted: 2024-06-06

## 2024-06-06 PROBLEM — J34.2 DEVIATED NASAL SEPTUM: Status: ACTIVE | Noted: 2024-06-06

## 2024-06-21 ENCOUNTER — APPOINTMENT (OUTPATIENT)
Dept: PRIMARY CARE | Facility: CLINIC | Age: 34
End: 2024-06-21
Payer: COMMERCIAL

## 2024-06-21 VITALS
HEART RATE: 89 BPM | WEIGHT: 183 LBS | TEMPERATURE: 97.5 F | DIASTOLIC BLOOD PRESSURE: 78 MMHG | BODY MASS INDEX: 32.43 KG/M2 | RESPIRATION RATE: 18 BRPM | SYSTOLIC BLOOD PRESSURE: 112 MMHG | OXYGEN SATURATION: 98 % | HEIGHT: 63 IN

## 2024-06-21 DIAGNOSIS — Z12.4 CERVICAL CANCER SCREENING: ICD-10-CM

## 2024-06-21 PROCEDURE — 87624 HPV HI-RISK TYP POOLED RSLT: CPT

## 2024-06-21 ASSESSMENT — PROMIS GLOBAL HEALTH SCALE
RATE_PHYSICAL_HEALTH: GOOD
RATE_SOCIAL_SATISFACTION: VERY GOOD
CARRYOUT_SOCIAL_ACTIVITIES: VERY GOOD
EMOTIONAL_PROBLEMS: NEVER
RATE_GENERAL_HEALTH: GOOD
CARRYOUT_PHYSICAL_ACTIVITIES: COMPLETELY
RATE_QUALITY_OF_LIFE: VERY GOOD
RATE_AVERAGE_FATIGUE: MILD
RATE_AVERAGE_PAIN: 0
RATE_MENTAL_HEALTH: VERY GOOD

## 2024-06-21 NOTE — PROGRESS NOTES
"Subjective   Patient ID: Rhoda SEEMA Mackey is a 34 y.o. female who presents for Gynecologic Exam (Pt here today for a PAP; LMP was 6/10/24. ).    HPI     OB history:  # pregnancies: 0    Menstrual history:  - Age at first period:   - Age at menopause (if applicable):   - Age mother started menopause:   - Frequency of periods:   - Are they regular:  - Bleeding/spotting between periods:   - Duration of periods:   - Bleeding amount (heavy, moderate, mild):   - Cramping with periods:     Sexual history:  - Currently sexually active: yes, one partner   - no screenings    PAP  - Personal or family h/o cervical or ovarian cancer:   - Last PAP: a couple years ago   - Abnormalities: yes 4 years ago but on the follow up bx it was normal   - Saw GYN on 9/2/2020 - neg PAP, positive HPV 18.  - Had Colposcopy 10/14/2020 - \"glandular tissue with chronic inflammation, negative for dysplasia.\" Advised PAP in 1 year - had not done    Symptoms:  - Pelvic pain: no   - Abnormal discharge: no   - Vaginal itching: no   - Dysuria: no   - Pain or bleeding with intercourse: no    Breast:  - Personal or family h/o breast cancer:   - Rashes or skin abnormality:   - Nipple Discharge:   - New or abnormal lumps:   - Last mammogram:   - Ever had an abnormal mammogram or ultrasound:     Review of Systems   Genitourinary:  Negative for dysuria, menstrual problem, pelvic pain, vaginal bleeding, vaginal discharge and vaginal pain.       Objective   /78   Pulse 89   Temp 36.4 °C (97.5 °F)   Resp 18   Ht 1.607 m (5' 3.25\")   Wt 83 kg (183 lb)   SpO2 98%   BMI 32.16 kg/m²     Physical Exam  Exam conducted with a chaperone present (Pinky Najera MA present during PAP and genital exam).   Constitutional:       Appearance: Normal appearance.   Genitourinary:     General: Normal vulva.      Exam position: Lithotomy position.      Labia:         Right: No rash or lesion.         Left: No rash or lesion.       Vagina: No tenderness, bleeding or lesions. " "     Cervix: No cervical motion tenderness, friability, lesion or erythema.   Neurological:      Mental Status: She is alert.         Assessment/Plan     Problem List Items Addressed This Visit    None  Visit Diagnoses       Cervical cancer screening        Relevant Orders    THINPREP PAP TEST    HPV DNA High Risk With Genotype              SCREENING PAP SMEAR:   - Saw GYN on 9/2/2020 - neg PAP, positive HPV 18.  - Had Colposcopy 10/14/2020 - \"glandular tissue with chronic inflammation, negative for dysplasia.\" Advised PAP in 1 year - had not done  - Menstrual cycles - regular  - Sexual hx - monogamous with one partner, declines STI screenings   - Today, unremarkable pelvic exam.   - PAP was performed and sent for pathology with HPV testing  - All the pt's questions were answered.   - Will call the pt with updated plan when all results become available.    Pt advised to anticipate results communication within 1-2 weeks, if not, should call our office to inquire.     "

## 2024-06-25 ASSESSMENT — ENCOUNTER SYMPTOMS: DYSURIA: 0

## 2024-06-28 ENCOUNTER — APPOINTMENT (OUTPATIENT)
Dept: ORTHOPEDIC SURGERY | Facility: CLINIC | Age: 34
End: 2024-06-28
Payer: COMMERCIAL

## 2024-07-01 DIAGNOSIS — R87.810 HUMAN PAPILLOMAVIRUS (HPV) TYPE 18 DNA DETECTED IN CERVICAL SPECIMEN: ICD-10-CM

## 2024-07-01 DIAGNOSIS — R87.612 LGSIL ON PAP SMEAR OF CERVIX: Primary | ICD-10-CM

## 2024-07-01 LAB
CYTOLOGY CMNT CVX/VAG CYTO-IMP: NORMAL
HPV HR 12 DNA GENITAL QL NAA+PROBE: NEGATIVE
HPV HR GENOTYPES PNL CVX NAA+PROBE: POSITIVE
HPV16 DNA SPEC QL NAA+PROBE: NEGATIVE
HPV18 DNA SPEC QL NAA+PROBE: POSITIVE
LAB AP HPV GENOTYPE QUESTION: YES
LAB AP HPV HR: NORMAL
LABORATORY COMMENT REPORT: NORMAL
PATH REPORT.TOTAL CANCER: NORMAL

## 2024-07-29 ENCOUNTER — APPOINTMENT (OUTPATIENT)
Dept: OBSTETRICS AND GYNECOLOGY | Facility: CLINIC | Age: 34
End: 2024-07-29
Payer: COMMERCIAL

## 2024-07-29 VITALS
SYSTOLIC BLOOD PRESSURE: 122 MMHG | HEIGHT: 63 IN | DIASTOLIC BLOOD PRESSURE: 81 MMHG | BODY MASS INDEX: 32.57 KG/M2 | WEIGHT: 183.8 LBS

## 2024-07-29 DIAGNOSIS — R87.810 HUMAN PAPILLOMAVIRUS (HPV) TYPE 18 DNA DETECTED IN CERVICAL SPECIMEN: ICD-10-CM

## 2024-07-29 DIAGNOSIS — R87.612 LGSIL ON PAP SMEAR OF CERVIX: Primary | ICD-10-CM

## 2024-07-29 PROCEDURE — 3008F BODY MASS INDEX DOCD: CPT | Performed by: ADVANCED PRACTICE MIDWIFE

## 2024-07-29 PROCEDURE — 99202 OFFICE O/P NEW SF 15 MIN: CPT | Performed by: ADVANCED PRACTICE MIDWIFE

## 2024-07-29 NOTE — PROGRESS NOTES
"GYNECOLOGY PROGRESS NOTE          CC:   Patient here for follow up from an abnormal pap. Patient had an abnormal pap and colpo in 2020. Pap 2020 Atypical glandular cells. Colpo was negative for dysplasia. 1st pap after colpo was recent 7/2024 that was LGSIL,ASCUS can not exclude high grade. +HPV 18. Reviewed the results with the patient and need for another colpo. D/O colpo and how the procedure is done again and why. D/O treatment/follow up will be based on the colpo results.   Chief Complaint   Patient presents with    New Patient Visit     New patient visit.   Patient had an abnormal pap 6/20224 LSIL hpv 18+   Had an abnormal before. About 6 yrs ago had a colpo        HPI:  Rhoda Mackey is here after abnormal pap.      ROS:  GYN - see HPI        PHYSICAL EXAM:  /81 (BP Location: Right arm)   Ht 1.6 m (5' 3\")   Wt 83.4 kg (183 lb 12.8 oz)   LMP 07/08/2024   BMI 32.56 kg/m²   GEN:  A&O, NAD  HEENT:  head HC/AT, no visible goiter  PSYCH:  normal affect, non-anxious      IMPRESSION/PLAN:  A: Hx of FELIX pap with negative colpo in 2020. Pap 2024 LGSIL,ASCUS can not exclude high grade.  Plan: 1. Scheduled for a colpo with  8/26@3pm.      Problem List Items Addressed This Visit    None  Visit Diagnoses       LGSIL on Pap smear of cervix        Human papillomavirus (HPV) type 18 DNA detected in cervical specimen                   Farrah Reid, NANDO-MERNAM        "

## 2024-08-06 DIAGNOSIS — Z30.9 ENCOUNTER FOR CONTRACEPTIVE MANAGEMENT, UNSPECIFIED TYPE: ICD-10-CM

## 2024-08-06 RX ORDER — LEVONORGESTREL/ETHIN.ESTRADIOL 0.1-0.02MG
1 TABLET ORAL DAILY
Qty: 28 TABLET | Refills: 12 | Status: SHIPPED | OUTPATIENT
Start: 2024-08-06

## 2024-08-26 ENCOUNTER — APPOINTMENT (OUTPATIENT)
Dept: OBSTETRICS AND GYNECOLOGY | Facility: CLINIC | Age: 34
End: 2024-08-26
Payer: COMMERCIAL

## 2024-08-26 VITALS
WEIGHT: 175.5 LBS | BODY MASS INDEX: 31.1 KG/M2 | HEIGHT: 63 IN | DIASTOLIC BLOOD PRESSURE: 70 MMHG | SYSTOLIC BLOOD PRESSURE: 100 MMHG

## 2024-08-26 DIAGNOSIS — R87.810 HUMAN PAPILLOMAVIRUS (HPV) TYPE 18 DNA DETECTED IN CERVICAL SPECIMEN: ICD-10-CM

## 2024-08-26 DIAGNOSIS — Z71.85 HPV VACCINE COUNSELING: ICD-10-CM

## 2024-08-26 DIAGNOSIS — Z31.69 ENCOUNTER FOR PRECONCEPTION CONSULTATION: ICD-10-CM

## 2024-08-26 DIAGNOSIS — R87.611 PAPANICOLAOU SMEAR OF CERVIX WITH ATYPICAL SQUAMOUS CELLS CANNOT EXCLUDE HIGH GRADE SQUAMOUS INTRAEPITHELIAL LESION (ASC-H): Primary | ICD-10-CM

## 2024-08-26 PROBLEM — Z30.9 CONTRACEPTION MANAGEMENT: Status: RESOLVED | Noted: 2024-05-10 | Resolved: 2024-08-26

## 2024-08-26 LAB — PREGNANCY TEST URINE, POC: NEGATIVE

## 2024-08-26 PROCEDURE — 88342 IMHCHEM/IMCYTCHM 1ST ANTB: CPT | Performed by: PATHOLOGY

## 2024-08-26 PROCEDURE — 88305 TISSUE EXAM BY PATHOLOGIST: CPT | Performed by: PATHOLOGY

## 2024-08-26 PROCEDURE — 88342 IMHCHEM/IMCYTCHM 1ST ANTB: CPT

## 2024-08-26 PROCEDURE — 81025 URINE PREGNANCY TEST: CPT | Performed by: OBSTETRICS & GYNECOLOGY

## 2024-08-26 PROCEDURE — 57454 BX/CURETT OF CERVIX W/SCOPE: CPT | Performed by: OBSTETRICS & GYNECOLOGY

## 2024-08-26 PROCEDURE — 88305 TISSUE EXAM BY PATHOLOGIST: CPT

## 2024-08-26 NOTE — PROGRESS NOTES
GYNECOLOGY PROGRESS NOTE          CC:     Chief Complaint   Patient presents with    Procedure     Est patient - Colpo procedure  Preg test neg  Pap 2024 LGSIL w/HPV Deb 18 positive  Chaperone bárbara alvarado ma        HPI:  Rhoda Mackey is scheduled today for COLPO for abnormal Pap smear.  Pap=ASC-H/LGSIL, + HPV 18.      Last pap in --normal/+HPV 18 with normal COLPO.   No new GYN complaints.  No AUB or vaginal discharge.  BC=condoms.   0, ?plans for children in the future.    Gardasil:  no  Smoker:   no      ROS:  GYN - see HPI      HISTORY:  Past Surgical History:   Procedure Laterality Date    COLPOSCOPY      wnl--for + HPV 18 pap    DECUBITUS ULCER EXCISION      Decubitus ulcer excision    NASAL SEPTUM SURGERY       Social History     Tobacco Use    Smoking status: Never    Smokeless tobacco: Never   Vaping Use    Vaping status: Never Used   Substance Use Topics    Alcohol use: Yes     Alcohol/week: 3.0 standard drinks of alcohol     Types: 3 Standard drinks or equivalent per week    Drug use: Never           PHYSICAL EXAM:  GEN:  A&O, NAD  URO:  normal urethral meatus  GYN:  normal vulva and perineum w/o lesions or ulcers, normal vagina without discharge or lesions, normal cervix without lesions or discharge  PSYCH:  normal affect, non-anxious      BLUE=TZ  RED=biopsy sites      ----------------------------------------------------------------------------    COLPOSCOPY PROCEDURE NOTE    Patient was consented after risks reviewed.  Patient was placed in lithotomy position and a speculum was placed.  The cervix was fully visualized and was cleansed with saline.  Visual inspection was performed and the transformation zone was fully visualized.  Acetic acid was placed on the cervix and findings include minimal aceto-white changes, no atypical lesions seen. Lugol's iodine solution was then placed on the cervix and no non-staining areas were identified (see cervix diagram).  Cervical biopsies were performed  at 7/11/2 o'clock, along with an ECC performed and cytology collected with Cytobrush.   Silver nitrate was applied for additional hemostasis.  Patient tolerated the procedure well, there was minimal EBL and there were no complications.    ----------------------------------------------------------------------------    IMPRESSION/PLAN:    Problem List Items Addressed This Visit    None  Visit Diagnoses       Papanicolaou smear of cervix with atypical squamous cells cannot exclude high grade squamous intraepithelial lesion (ASC-H)    -  Primary    Relevant Orders    Surgical Pathology Exam    Human papillomavirus (HPV) type 18 DNA detected in cervical specimen        Relevant Orders    POCT pregnancy, urine manually resulted (Completed)    Surgical Pathology Exam    HPV vaccine counseling        Encounter for preconception consultation              Abnormal pap smear:  Pap=ASC-H/LGSIL with + HPV 18.  Prior COLPO for HPV18+ in 2020 was normal.  Counseling done on abnormal paps and COLPO.  Natural course of HPV and high rates of regression of LGSIL reviewed with patient.  Need for treatment if HGSIL/CIN3 and treatment vs close surveillance with HGSIL/CIN2 discussed.  COLPO done today, patient tolerated well.  GOOD candidate for office LEEP.  Risk of HGSIL/need for treatment per ASCCP acacia calculator=23%.    Gardasil vaccination:  Gardasil vaccine counseling done and vaccination highly recommended. Patient has not previously been vaccinated.  Encouraged patient to check insurance benefits and contact office to schedule nurse appt if elects to proceed with vaccination.     Preconceptual counseling:   Patient's medical, surgical, family, and social history reviewed along with current medications.  Pertinent pregnancy issues include AMA, obesity.   Preconceptual use of OTC MVI with folic acid (at least 400 µg of folic acid) more recommended 2 months prior to conception.  Recommend all vaccinations be UTD prior to  pregnancy--including flu, Covid, and chickenpox (if not previously immunized or infected).  No FMHx of Cystic Fibrosis, Sickle Cell Disease/Trait, or birth defects.  BMI noted, healthy diet/exercise/weight optimization of weight prior to pregnancy recommended.  Counseled patient that I am no longer doing OB, will refer to CNM (preferable) or another OB physician in practice for any OB care in the future.      F/U for TELE visit in 2-3 weeks for discussion of COLPO results.      Benito Solitario, DO

## 2024-09-06 LAB
LAB AP ASR DISCLAIMER: NORMAL
LABORATORY COMMENT REPORT: NORMAL
PATH REPORT.COMMENTS IMP SPEC: NORMAL
PATH REPORT.FINAL DX SPEC: NORMAL
PATH REPORT.GROSS SPEC: NORMAL
PATH REPORT.RELEVANT HX SPEC: NORMAL
PATH REPORT.TOTAL CANCER: NORMAL

## 2024-09-16 ENCOUNTER — APPOINTMENT (OUTPATIENT)
Dept: OBSTETRICS AND GYNECOLOGY | Facility: CLINIC | Age: 34
End: 2024-09-16
Payer: COMMERCIAL

## 2024-09-16 DIAGNOSIS — R87.611 PAPANICOLAOU SMEAR OF CERVIX WITH ATYPICAL SQUAMOUS CELLS CANNOT EXCLUDE HIGH GRADE SQUAMOUS INTRAEPITHELIAL LESION (ASC-H): ICD-10-CM

## 2024-09-16 DIAGNOSIS — R87.810 HIGH-RISK HUMAN PAPILLOMAVIRUS (HPV) DNA DETECTED IN CERVICAL SPECIMEN: Primary | ICD-10-CM

## 2024-09-16 DIAGNOSIS — Z71.85 HPV VACCINE COUNSELING: ICD-10-CM

## 2024-09-16 PROCEDURE — 99213 OFFICE O/P EST LOW 20 MIN: CPT | Performed by: OBSTETRICS & GYNECOLOGY

## 2024-09-16 NOTE — PROGRESS NOTES
GYNECOLOGY VIRTUAL VISIT PROGRESS NOTE          CC:     Chief Complaint   Patient presents with    Follow-up     COLPO results        HPI:  Rhoda Mackey is scheduled today for virtual visit to discuss test results.   Audio and Visual communication real-time were utilized and verbal consent was obtained for the encounter.    No new GYN complaints.  Patient did OK after office COLPO.      ROS:  GYN - see HPI      PHYSICAL EXAM:  VS:  n/a (virtual visit)  GEN:  A&O, NAD  PSYCH:  normal affect, non-anxious      IMPRESSION/PLAN:    Problem List Items Addressed This Visit       High-risk human papillomavirus (HPV) DNA detected in cervical specimen - Primary     Other Visit Diagnoses       Papanicolaou smear of cervix with atypical squamous cells cannot exclude high grade squamous intraepithelial lesion (ASC-H)        HPV vaccine counseling              Abnormal pap:  Pap=ASC-H/LGSIL, HPV 18+.  COLPO results=normal--discussed with patient.  Treatment NOT indicated.  Per [facility] guidelines follow-up is repeat Pap/HPV testing in 1 year.  Smoker=no.  Gardasil vaccination=no--done, recommended.      F/U in 1 year with Farrah Reid for annual examination with repeat Pap/HPV testing.      Benito Solitario DO

## 2025-07-02 DIAGNOSIS — Z30.9 ENCOUNTER FOR CONTRACEPTIVE MANAGEMENT, UNSPECIFIED TYPE: ICD-10-CM

## 2025-07-08 RX ORDER — LEVONORGESTREL/ETHIN.ESTRADIOL 0.1-0.02MG
1 TABLET ORAL DAILY
Qty: 84 TABLET | Refills: 0 | Status: SHIPPED | OUTPATIENT
Start: 2025-07-08

## 2025-08-01 ENCOUNTER — APPOINTMENT (OUTPATIENT)
Dept: PRIMARY CARE | Facility: CLINIC | Age: 35
End: 2025-08-01
Payer: COMMERCIAL

## 2025-08-15 ENCOUNTER — APPOINTMENT (OUTPATIENT)
Dept: PRIMARY CARE | Facility: CLINIC | Age: 35
End: 2025-08-15
Payer: COMMERCIAL

## 2025-08-29 ENCOUNTER — APPOINTMENT (OUTPATIENT)
Dept: PRIMARY CARE | Facility: CLINIC | Age: 35
End: 2025-08-29
Payer: COMMERCIAL

## 2025-09-17 ENCOUNTER — APPOINTMENT (OUTPATIENT)
Dept: OBSTETRICS AND GYNECOLOGY | Facility: CLINIC | Age: 35
End: 2025-09-17
Payer: COMMERCIAL

## 2025-09-20 ENCOUNTER — APPOINTMENT (OUTPATIENT)
Dept: PRIMARY CARE | Facility: CLINIC | Age: 35
End: 2025-09-20
Payer: COMMERCIAL